# Patient Record
Sex: FEMALE | Race: WHITE
[De-identification: names, ages, dates, MRNs, and addresses within clinical notes are randomized per-mention and may not be internally consistent; named-entity substitution may affect disease eponyms.]

---

## 2019-12-16 ENCOUNTER — HOSPITAL ENCOUNTER (EMERGENCY)
Dept: HOSPITAL 50 - VM.ED | Age: 19
Discharge: HOME | End: 2019-12-16
Payer: COMMERCIAL

## 2019-12-16 DIAGNOSIS — J10.1: Primary | ICD-10-CM

## 2019-12-16 NOTE — EDM.PDOC
ED HPI GENERAL MEDICAL PROBLEM





- General


Chief Complaint: Fever


Stated Complaint: MIGRAINE, CHILLS AND NAUSIA


Time Seen by Provider: 12/16/19 20:41





- History of Present Illness


INITIAL COMMENTS - FREE TEXT/NARRATIVE: 





Elly is a 20 y/o female who comes to the ER with a a bad headache that started 

last night. She has since developed a fever, chills, sore throat, and clear 

runny nose. Her throat is sore and she just wants to sleep.


  ** Frontal Headache


Pain Score (Numeric/FACES): 8





- Related Data


Home Meds: 


 Home Meds





Benzonatate 100 mg PO Q8HR PRN #60 capsule 12/16/19 [Rx]


Codeine/Promethazine [Phenergan with Codeine] 10 ml PO Q4HR PRN #240 ml 12/16/ 19 [Rx]











Past Medical History


Respiratory History: Reports: Asthma, Other (See Below)


Other Respiratory History: lung collapsed


Psychiatric History: Reports: Anxiety, Depression





- Past Surgical History


HEENT Surgical History: Reports: Other (See Below)


Other HEENT Surgeries/Procedures: wisdom teeth removed


GI Surgical History: Reports: Appendectomy


Female  Surgical History: Reports: D&C





Social & Family History





- Tobacco Use


Smoking Status *Q: Never Smoker





- Alcohol Use


Days Per Week of Alcohol Use: 1


Number of Drinks Per Day: 2


Total Drinks Per Week: 2





- Recreational Drug Use


Recreational Drug Use: No





Review of Systems





- Review of Systems


Review Of Systems: See Below


Constitutional: Reports: Chills, Fever


Eyes: Reports: No Symptoms


Ears: Reports: No Symptoms


Nose: Reports: Clear Discharge


Mouth/Throat: Reports: Other (Sore throat)


Respiratory: Reports: Cough


Cardiovascular: Reports: No Symptoms


GI/Abdominal: Reports: No Symptoms


Genitourinary: Reports: No Symptoms


Musculoskeletal: Reports: Other (Bosy Aches)


Skin: Reports: No Symptoms


Neurological: Reports: Headache


Psychiatric: Reports: No Symptoms





ED EXAM, GENERAL





- Physical Exam


Exam: See Below


Exam Limited By: No Limitations


General Appearance: Alert, WD/WN, No Apparent Distress, Other (Adult female, 

appears to not feel well)


Ears: Normal External Exam, Normal Canal, Hearing Grossly Normal, Normal TMs


Nose: Normal Inspection, Normal Mucosa, No Blood


Throat/Mouth: Normal Inspection, Normal Lips, Normal Teeth, Normal Gums, Normal 

Voice, No Airway Compromise, Inflammation


Head: Atraumatic, Normocephalic


Neck: Normal Inspection, Supple, Non-Tender


Respiratory/Chest: No Respiratory Distress, Lungs Clear, Normal Breath Sounds, 

Chest Non-Tender


Cardiovascular: Normal Peripheral Pulses, Regular Rate, Rhythm, No Edema, No 

Murmur


GI/Abdominal: Normal Bowel Sounds, Soft, Non-Tender


 (Female) Exam: Deferred


Rectal (Female) Exam: Deferred


Back Exam: Normal Inspection, Full Range of Motion


Extremities: Normal Inspection, Normal Range of Motion, Normal Capillary Refill


Neurological: Alert, Oriented, CN II-XII Intact, Normal Cognition, Normal Gait


Psychiatric: Normal Affect, Normal Mood


Skin Exam: Dry, Intact, Normal Color, No Rash, Increased Warmth


Lymphatic: No Adenopathy





Course





- Vital Signs


Text/Narrative:: 





The patient was seen by the CNP. Influenza test was ordered. Results were 

reviewed. Discharge instructions were given and she was sent home in stable 

condition.


Last Recorded V/S: 





 Last Vital Signs











Temp  37.8 C   12/16/19 20:00


 


Pulse  118 H  12/16/19 20:00


 


Resp  20   12/16/19 20:00


 


BP  130/68   12/16/19 20:00


 


Pulse Ox  98   12/16/19 20:00














- Orders/Labs/Meds


Labs: 





InfluenzaA/B=neb/pos





Departure





- Departure


Time of Disposition: 20:58


Disposition: Home, Self-Care 01


Condition: Good


Clinical Impression: 


 Influenza B








- Discharge Information


*PRESCRIPTION DRUG MONITORING PROGRAM REVIEWED*: No


*COPY OF PRESCRIPTION DRUG MONITORING REPORT IN PATIENT VALARIE: No


Instructions:  Influenza, Adult, Easy-to-Read


Referrals: 


Katie Navarro MD [Primary Care Provider] - 


Forms:  ED Department Discharge, ED Return to Work/School Form


Additional Instructions: 


-Phenergan with Codeine 5-10 ml po q4hr prn cough #240ml(Rx)


-Benzonate 100mg caps po q 8hr prn cough #60(Rx)


-Acetaminophen/Ibuprofen as needed for pain


-Use any over the counter meds that are helpful


-Rest


-Stay well hydrated


-Return to the ER or see your PCP if you are unable to keep fluids down or have 

difficulty breathing





Sepsis Event Note





- Evaluation


Sepsis Screening Result: Possible Sepsis Risk





- Focused Exam


Vital Signs: 





 Vital Signs











  Temp Pulse Resp BP Pulse Ox


 


 12/16/19 20:00  37.8 C  118 H  20  130/68  98











Date Exam was Performed: 12/16/19


Time Exam was Performed: 20:42